# Patient Record
Sex: MALE | Race: BLACK OR AFRICAN AMERICAN | NOT HISPANIC OR LATINO | Employment: FULL TIME | URBAN - NONMETROPOLITAN AREA
[De-identification: names, ages, dates, MRNs, and addresses within clinical notes are randomized per-mention and may not be internally consistent; named-entity substitution may affect disease eponyms.]

---

## 2018-02-07 ENCOUNTER — HOSPITAL ENCOUNTER (EMERGENCY)
Facility: HOSPITAL | Age: 58
Discharge: HOME | End: 2018-02-07
Attending: FAMILY MEDICINE | Admitting: FAMILY MEDICINE

## 2018-02-07 VITALS
SYSTOLIC BLOOD PRESSURE: 133 MMHG | HEART RATE: 71 BPM | RESPIRATION RATE: 18 BRPM | DIASTOLIC BLOOD PRESSURE: 88 MMHG | OXYGEN SATURATION: 99 % | HEIGHT: 74 IN | BODY MASS INDEX: 39.14 KG/M2 | TEMPERATURE: 97.5 F | WEIGHT: 305 LBS

## 2018-02-07 DIAGNOSIS — K64.8 HEMORRHOID PROLAPSE: Primary | ICD-10-CM

## 2018-02-07 PROCEDURE — 25010000002 KETOROLAC TROMETHAMINE PER 15 MG: Performed by: FAMILY MEDICINE

## 2018-02-07 PROCEDURE — 99283 EMERGENCY DEPT VISIT LOW MDM: CPT

## 2018-02-07 PROCEDURE — 96372 THER/PROPH/DIAG INJ SC/IM: CPT

## 2018-02-07 RX ORDER — HYDROCODONE BITARTRATE AND ACETAMINOPHEN 7.5; 325 MG/1; MG/1
1 TABLET ORAL EVERY 6 HOURS PRN
Qty: 15 TABLET | Refills: 0 | Status: SHIPPED | OUTPATIENT
Start: 2018-02-07

## 2018-02-07 RX ORDER — ENALAPRIL MALEATE 10 MG/1
10 TABLET ORAL DAILY
COMMUNITY

## 2018-02-07 RX ORDER — KETOROLAC TROMETHAMINE 30 MG/ML
60 INJECTION, SOLUTION INTRAMUSCULAR; INTRAVENOUS ONCE
Status: COMPLETED | OUTPATIENT
Start: 2018-02-07 | End: 2018-02-07

## 2018-02-07 RX ORDER — HYDROCHLOROTHIAZIDE 12.5 MG/1
12.5 TABLET ORAL DAILY
COMMUNITY

## 2018-02-07 RX ORDER — ONDANSETRON 4 MG/1
4 TABLET, ORALLY DISINTEGRATING ORAL EVERY 6 HOURS PRN
Qty: 10 TABLET | Refills: 0 | Status: SHIPPED | OUTPATIENT
Start: 2018-02-07

## 2018-02-07 RX ADMIN — KETOROLAC TROMETHAMINE 60 MG: 60 INJECTION, SOLUTION INTRAMUSCULAR at 12:07

## 2018-02-07 NOTE — ED PROVIDER NOTES
Subjective   HPI Comments: BM 2 days ago that lead to increased rectal pain. Pt has h/o hemorrhoids that he usually reduces manually. Preparation H has helped to some degree.     Patient is a 57 y.o. male presenting with GI illness.   GI Problem   Primary symptoms do not include fever, fatigue, abdominal pain, nausea, vomiting, diarrhea, dysuria, myalgias or rash. The onset was gradual.   The illness does not include chills, anorexia or dysphagia. Significant associated medical issues include hemorrhoids.       Review of Systems   Constitutional: Negative for appetite change, chills, diaphoresis, fatigue and fever.   HENT: Negative for congestion, ear discharge, ear pain, nosebleeds, rhinorrhea, sinus pressure, sore throat and trouble swallowing.    Eyes: Negative for discharge and redness.   Respiratory: Negative for apnea, cough, chest tightness, shortness of breath and wheezing.    Cardiovascular: Negative for chest pain.   Gastrointestinal: Negative for abdominal pain, anorexia, diarrhea, dysphagia, nausea and vomiting.   Endocrine: Negative for polyuria.   Genitourinary: Negative for dysuria, frequency and urgency.   Musculoskeletal: Negative for myalgias and neck pain.   Skin: Negative for color change and rash.   Allergic/Immunologic: Negative for immunocompromised state.   Neurological: Negative for dizziness, seizures, syncope, weakness, light-headedness and headaches.   Hematological: Negative for adenopathy. Does not bruise/bleed easily.   Psychiatric/Behavioral: Negative for behavioral problems and confusion.   All other systems reviewed and are negative.      Past Medical History:   Diagnosis Date   • Hemorrhoids    • Hypertension    • Kidney stone        No Known Allergies    Past Surgical History:   Procedure Laterality Date   • CORONARY ARTERY BYPASS GRAFT      1982       History reviewed. No pertinent family history.    Social History     Social History   • Marital status:      Spouse name:  N/A   • Number of children: N/A   • Years of education: N/A     Social History Main Topics   • Smoking status: Former Smoker   • Smokeless tobacco: None   • Alcohol use No   • Drug use: None   • Sexual activity: Not Asked     Other Topics Concern   • None     Social History Narrative   • None           Objective   Physical Exam   Constitutional: He is oriented to person, place, and time. He appears well-developed and well-nourished.   HENT:   Head: Normocephalic and atraumatic.   Nose: Nose normal.   Mouth/Throat: Oropharynx is clear and moist.   Eyes: Conjunctivae and EOM are normal. Pupils are equal, round, and reactive to light. Right eye exhibits no discharge. Left eye exhibits no discharge. No scleral icterus.   Neck: Normal range of motion. Neck supple. No tracheal deviation present.   Cardiovascular: Normal rate, regular rhythm and normal heart sounds.    No murmur heard.  Pulmonary/Chest: Effort normal and breath sounds normal. No stridor. No respiratory distress. He has no wheezes. He has no rales.   Abdominal: Soft. Bowel sounds are normal. He exhibits no distension and no mass. There is no tenderness. There is no rebound and no guarding.   Musculoskeletal: He exhibits no edema.   Neurological: He is alert and oriented to person, place, and time. Coordination normal.   Skin: Skin is warm and dry. No rash noted. No erythema.   Psychiatric: He has a normal mood and affect. His behavior is normal. Thought content normal.   Nursing note and vitals reviewed.      Procedures         ED Course  ED Course      Hemorrhoid moderately reduced in ED. Pt advised to follow up with general surgery for chronic hemorrhoids but states that he wants to wait several more weeks.     Labs Reviewed - No data to display    No orders to display                 MDM    Final diagnoses:   Hemorrhoid prolapse            Phi Pérez MD  02/12/18 2595       Phi Pérez MD  02/12/18 2996

## 2018-02-07 NOTE — ED TRIAGE NOTES
Patient presents to the ED c/o a rectal tear. He said this has been present for years but the last few days, his pain has been increased. Patient reports rectal bleeding.